# Patient Record
Sex: MALE | URBAN - METROPOLITAN AREA
[De-identification: names, ages, dates, MRNs, and addresses within clinical notes are randomized per-mention and may not be internally consistent; named-entity substitution may affect disease eponyms.]

---

## 2022-06-14 ENCOUNTER — OFFICE VISIT (OUTPATIENT)
Dept: ORTHOPEDIC SURGERY | Age: 32
End: 2022-06-14
Payer: COMMERCIAL

## 2022-06-14 VITALS — BODY MASS INDEX: 34.21 KG/M2 | WEIGHT: 218 LBS | TEMPERATURE: 97.3 F | HEIGHT: 67 IN

## 2022-06-14 DIAGNOSIS — S93.501A SPRAIN OF GREAT TOE, RIGHT, INITIAL ENCOUNTER: Primary | ICD-10-CM

## 2022-06-14 DIAGNOSIS — M79.671 RIGHT FOOT PAIN: ICD-10-CM

## 2022-06-14 PROCEDURE — 99203 OFFICE O/P NEW LOW 30 MIN: CPT | Performed by: ORTHOPAEDIC SURGERY

## 2022-06-14 PROCEDURE — 73630 X-RAY EXAM OF FOOT: CPT | Performed by: ORTHOPAEDIC SURGERY

## 2022-06-14 RX ORDER — MELOXICAM 15 MG/1
15 TABLET ORAL
Qty: 30 TABLET | Refills: 1 | Status: SHIPPED | OUTPATIENT
Start: 2022-06-14

## 2022-06-14 NOTE — PROGRESS NOTES
Eric Gómez  1990   Chief Complaint   Patient presents with    Foot Pain     rt        HISTORY OF PRESENT ILLNESS  Eric Gómez is a 27 y.o. male who presents today for evaluation of right foot pain. He rates his pain 5/10 today. He states a heavy cart weighing around 320 pounds rolled onto his foot and smashed his toes and they bent backwards on 6/10/2022. He was wearing regular shoes at the time. He did not notice much pain at that time of the incident but pain increased on 2022. He cannot put pressure on the ball of his foot. He localizes the pain to the right first MTP joint. Describes a throbbing pain with bearing weight. Endorses swelling. Has tried following treatments: Injections:NO; Brace:NO; Therapy:NO; Cane/Crutch:NO       No Known Allergies     History reviewed. No pertinent past medical history. Social History     Socioeconomic History    Marital status:      Spouse name: Not on file    Number of children: Not on file    Years of education: Not on file    Highest education level: Not on file   Occupational History    Not on file   Tobacco Use    Smoking status: Former Smoker     Years: 2.00     Quit date:      Years since quittin.4    Smokeless tobacco: Never Used   Substance and Sexual Activity    Alcohol use: Yes     Comment: Occ    Drug use: Never    Sexual activity: Yes   Other Topics Concern    Not on file   Social History Narrative    Not on file     Social Determinants of Health     Financial Resource Strain:     Difficulty of Paying Living Expenses: Not on file   Food Insecurity:     Worried About Running Out of Food in the Last Year: Not on file    Krissy of Food in the Last Year: Not on file   Transportation Needs:     Lack of Transportation (Medical): Not on file    Lack of Transportation (Non-Medical):  Not on file   Physical Activity:     Days of Exercise per Week: Not on file    Minutes of Exercise per Session: Not on file   Stress:  Feeling of Stress : Not on file   Social Connections:     Frequency of Communication with Friends and Family: Not on file    Frequency of Social Gatherings with Friends and Family: Not on file    Attends [employer] Services: Not on file    Active Member of Clubs or Organizations: Not on file    Attends Club or Organization Meetings: Not on file    Marital Status: Not on file   Intimate Partner Violence:     Fear of Current or Ex-Partner: Not on file    Emotionally Abused: Not on file    Physically Abused: Not on file    Sexually Abused: Not on file   Housing Stability:     Unable to Pay for Housing in the Last Year: Not on file    Number of [place] in the Last Year: Not on file    Unstable Housing in the Last Year: Not on file      Past Surgical History:   Procedure Laterality Date    HX VASECTOMY [facility]ilateral 05/29/2020    [facility], Dr. Chris Leung    HX WISDOM TEETH EXTRACTION        Family History   Problem Relation Age of Onset    Hypertension Father     Cancer Father         Multiple Myeloma      Current Outpatient Medications   Medication Sig    diazePAM (Valium) 10 mg tablet Take 30-45 minutes prior to procedure    phentermine 30 mg capsule Take 30 mg by mouth every seven (7) days. No current facility-administered medications for this visit. REVIEW OF SYSTEM   Patient denies: Weight loss, Fever/Chills, HA, Visual changes, Fatigue, Chest pain, SOB, Abdominal pain, N/V/D/C, Blood in stool or urine, Edema. Pertinent positive as above in HPI. All others were negative    PHYSICAL EXAM:   Visit Vitals  Temp 97.3 °F (36.3 °C) (Temporal)   Ht 5' 7\" (1.702 m)   Wt 218 lb (98.9 kg)   BMI 34.14 kg/m²     The patient is a well-developed, well-nourished male   in no acute distress. The patient is alert and oriented times three. The patient is alert and oriented times three.  Mood and affect are normal.  LYMPHATIC: lymph nodes are not enlarged and are within normal limits  SKIN: normal in color and non tender to palpation. There are no bruises or abrasions noted. NEUROLOGICAL: Motor sensory exam is within normal limits. Reflexes are equal bilaterally. There is normal sensation to pinprick and light touch  MUSCULOSKELETAL:  Examination Right Ankle/Foot   Skin bruise   Swelling + 1st mtp joint   Dorsiflexion 0   Plantarflexion 0   Deformity -   Inversion laxity -   Anterior drawer -   Medial malleolus tenderness -   Lateral malleolus tenderness -   Heel cord Intact   Heel cord Tightness -   Shook's Test -   Lawrence's Test -   Sensation Intact   Bunion -   Capillary refill Normal        IMAGING: XR of the right foot with 3 views obtained in the office dated 6/14/2022 was reviewed and read by Dr. Venkat Woody: Slight dorsal spur on first MTP joint      IMPRESSION:      ICD-10-CM ICD-9-CM    1. Sprain of great toe, right, initial encounter  S93.501A 845.10 AMB SUPPLY ORDER   2. Right foot pain  M79.671 729.5 AMB POC XRAY, FOOT; COMPLETE, 3+ VIEW        PLAN:  1. Pt presents today with right foot pain c/w first MTP joint sprain. Was fitted with short boot in the office today to wear while walking. Was also provided with prescription for Mobic and work note stating he can return to work on 6/16/2022. Risk factors include: BMI>30  2. No ultrasound exam indicated today  3. No cortisone injection indicated today   4. No Physical/Occupational Therapy indicated today  5. No diagnostic test indicated today:   6. Yes durable medical equipment indicated today SHORT BOOT  7. No referral indicated today   8. Yes medications indicated today: MOBIC  9. No Narcotic indicated today       RTC 2 weeks      Scribed by Gemma Hancock) as dictated by Ruth Torres MD    I, Dr. Ruth Torres, confirm that all documentation is accurate.     Ruth Torres M.D.   [phone]

## 2022-06-14 NOTE — LETTER
NOTIFICATION RETURN TO WORK / SCHOOL    6/14/2022 9:31 AM    Mr. Carla Soto  [id number] [address]      To Whom It May Concern:    Carla Soto is currently under the care of [address] [place] [address] Daniel Xavier. He will return to work on 6/16/2022. If there are questions or concerns please have the patient contact our office.         Sincerely,      Joel Ascencio MD

## 2022-07-11 ENCOUNTER — OFFICE VISIT (OUTPATIENT)
Dept: ORTHOPEDIC SURGERY | Age: 32
End: 2022-07-11
Payer: COMMERCIAL

## 2022-07-11 VITALS — BODY MASS INDEX: 34.21 KG/M2 | TEMPERATURE: 97.5 F | HEIGHT: 67 IN | WEIGHT: 218 LBS

## 2022-07-11 DIAGNOSIS — S93.501D SPRAIN OF RIGHT GREAT TOE, SUBSEQUENT ENCOUNTER: Primary | ICD-10-CM

## 2022-07-11 PROCEDURE — 99213 OFFICE O/P EST LOW 20 MIN: CPT | Performed by: ORTHOPAEDIC SURGERY

## 2022-07-11 NOTE — PROGRESS NOTES
Aurora Barr  1990   Chief Complaint   Patient presents with    Foot Pain     rt        HISTORY OF PRESENT ILLNESS  Aurora Barr is a 27 y.o. male who presents today for reevaluation of right foot pain. Patient rates pain as 0/10 today. He states a heavy cart weighing around 320 pounds rolled onto his foot and smashed his toes and they bent backwards on 6/10/2022. He was wearing regular shoes at the time. He did not notice much pain at that time of the incident but pain increased on 6/12/2022. He was initially unable to put pressure on the ball of his foot. He localized the pain to the right first MTP joint. He was fitted with short boot at last OV. Was also prescribed Mobic at last OV. He notes his symptoms have overall improved and really only has pain with flexing his toe with walking without boot. He notes swelling has been improving but may have aggravated his toe while sleeping around 1 week ago and noticed increase in swelling which has since improved. Patient denies any fever, chills, chest pain, shortness of breath or calf pain. The remainder of the review of systems is negative. There are no new illness or injuries to report since last seen in the office. There are no changes to medications, allergies, family or social history. PHYSICAL EXAM:   Visit Vitals  Temp 97.5 °F (36.4 °C) (Temporal)   Ht 5' 7\" (1.702 m)   Wt 218 lb (98.9 kg)   BMI 34.14 kg/m²     The patient is a well-developed, well-nourished male   in no acute distress. The patient is alert and oriented times three. The patient is alert and oriented times three. Mood and affect are normal.  LYMPHATIC: lymph nodes are not enlarged and are within normal limits  SKIN: normal in color and non tender to palpation. There are no bruises or abrasions noted. NEUROLOGICAL: Motor sensory exam is within normal limits. Reflexes are equal bilaterally.  There is normal sensation to pinprick and light touch  MUSCULOSKELETAL:  Examination Right Ankle/Foot   Skin bruise   Swelling + 1st mtp joint   Dorsiflexion 0   Plantarflexion 0   Deformity -   Inversion laxity -   Anterior drawer -   Medial malleolus tenderness -   Lateral malleolus tenderness -   Heel cord Intact   Heel cord Tightness -   Shook's Test -   Lawrence's Test -   Sensation Intact   Bunion -   Capillary refill Normal         IMAGING: XR of the right foot with 3 views obtained in the office dated 6/14/2022 was reviewed and read by Dr. Laura Doyle: Slight dorsal spur on first MTP joint       IMPRESSION:      ICD-10-CM ICD-9-CM    1. Sprain of right great toe, subsequent encounter  S93.501D V58.89      845.13         PLAN:   1. Pt presents today with right foot pain c/w first MTP joint sprain. He notes improvement in symptoms since last OV but presents with some persistent swelling. I instructed him to try weaning himself off of using the boot over the next 1.5 weeks and transition to supportive shoe. May have to consider further workup with MRI if swelling persists after 3 weeks. Risk factors include: BMI>30  2. No ultrasound exam indicated today  3. No cortisone injection indicated today   4. No Physical/Occupational Therapy indicated today  5. No diagnostic test indicated today:   6. No durable medical equipment indicated today  7. No referral indicated today   8. No medications indicated today:   9. No Narcotic indicated today      RTC 3 weeks       Scribed by Anusha Sorto) as dictated by Susan Lawton MD    I, Dr. Susan Lawton, confirm that all documentation is accurate.     Susan Lawton M.D.   [facility]